# Patient Record
(demographics unavailable — no encounter records)

---

## 2025-03-17 NOTE — REASON FOR VISIT
[Home] : at home, [unfilled] , at the time of the visit. [Medical Office: ([facility])___] : at the medical office located in  [Telehealth (audio & video)] : This visit was provided via telehealth using real-time 2-way audio visual technology. [Verbal consent obtained from patient] : the patient, [unfilled] [Post Hospitalization] : a post hospitalization visit

## 2025-03-17 NOTE — HISTORY OF PRESENT ILLNESS
[FreeTextEntry1] : 59 y/o male with hx of HLD, PD stent placed at [facility]  Recently discharged form [facility] s/p ERCP with stent placement for bile duct stones seen on computed tomogram scan with suspected ascending cholangitis with markedly dilated echogenic fossae in the common bile duct which measured up to 16 mm.  Gallbladder showing distended with asymmetric mural thickening and pericholecystic fluid and multiple stones.  Patient present with son today. As per son, patient is doing well. Son reports patient no longer has abdominal pain. He is eating well, and his surgical site post gallbladder removal is without any pain or discomfort. Patient son reports records obtained from [facility] and discussed with surgeon. He said as per surgeon patient have evidence of pancreatitis and was supposed to f/u with MRI which was not performed.  [de-identified] : 2/27/2025  - Conglomerate of large choledocholithiasis was found. Partial removal was accomplished with biliary sphincterotomy, balloon sweep. Multiple stones were removed but multiple stones still remained. - A 10 Fr x 7 cm straight plastic biliary stent was inserted. [de-identified] : ACC: [id number]     EXAM:  CT ABDOMEN AND PELVIS IC   ORDERED BY: TING LUIS PROCEDURE DATE:  02/26/2025    INTERPRETATION:  CLINICAL INFORMATION: Generalized abdominal pain, elevated alkaline phosphatase. History of pancreatic duct stent.  COMPARISON: None.  CONTRAST/COMPLICATIONS: IV Contrast: Omnipaque 350  80 cc administered   20 cc discarded Oral Contrast: NONE .  PROCEDURE: CT of the Abdomen and Pelvis was performed. Sagittal and coronal reformats were performed.  FINDINGS: LOWER CHEST: Within normal limits.  LIVER: Within normal limits. BILE DUCTS: Markedly dilated with dependent echogenic foci in the common bile duct, which measures up to 16 mm. GALLBLADDER: Distended with asymmetric mural thickening and pericholecystic fluid. Multiple stones. SPLEEN: Within normal limits. PANCREAS: Predominantly atrophic parenchyma with pancreatic duct dilated up to 8mm with tapering to normal caliber in the head (602:29). ADRENALS: Within normal limits. KIDNEYS/URETERS: Tiny right cortical cysts.  BLADDER: Within normal limits. REPRODUCTIVE ORGANS: Prostate within normal limits.  BOWEL: No bowel obstruction. Appendix is normal. Colonic diverticulosis without acute diverticulitis. PERITONEUM/RETROPERITONEUM: Trace free fluid in the pelvis. VESSELS: Atherosclerotic changes. LYMPH NODES: No lymphadenopathy. ABDOMINAL WALL: Within normal limits. BONES: Within normal limits.  IMPRESSION: Findings are suggestive of acute cholecystitis. Marked biliary ductal dilatation secondary to choledocholithiasis. Marked pancreatic ductal dilatation with transition to normal caliber in the head, likely from a stricture.  --- End of Report ---  [de-identified] : ACC: [id number]     EXAM:  US ABDOMEN RT UPR QUADRANT   ORDERED BY: AIMEE PULIDO PROCEDURE DATE:  02/26/2025    INTERPRETATION:  CLINICAL INFORMATION: Elevated LFTs  COMPARISON: None available.  TECHNIQUE: Sonography of the right upper quadrant.  FINDINGS: Liver: 1.2 x 0.7 x 0.9 cm left lobe cyst Bile ducts: Dilated intra and extrahepatic bile ducts Common bile duct measures 16 mm. with intraluminal echogenic sludgelike material Gallbladder: Thick-walled gallbladder (up to 9 mm) with pericholecystic fluid, and sludge. Questionable stone versus polyp measuring 1.6 cm. Negative sonographic Stone's sign Pancreas: Cystic area within the visualized pancreas, likely dilated main pancreatic duct Right kidney: 10.7 cm. No hydronephrosis. Ascites: None. IVC: Visualized portions are within normal limits.  IMPRESSION: Dilated intra and extrahepatic bile ducts with the common bile duct measuring up to 1.6 cm with intraluminal echogenic material.  Thick-walled gallbladder with pericholecystic fluid, sludge, and questionable stone versus polyp. Negative sonographic Stone sign.  Dilated main pancreatic duct, partly imaged.  MRCP or ERCP is recommended for further evaluation.    --- End of Report ---

## 2025-03-17 NOTE — ASSESSMENT
[FreeTextEntry1] : 61 y/o male s/p ERCP w/ stent for CBD stones seen on CT imaging. ERCP with Conglomerate of large choledocholithiasis was found with partial removal accomplished with biliary sphincterotomy and balloon sweep who is recommended for a follow up ERCP for stent and removal of remaining stones.   We also discussed giving the CT finding of Marked pancreatic ductal dilatation with transition to normal caliber in the head, likely from a stricture and hx of Pancreatitis seen at [facility] we would recommend EUS of pancreas to r/o mass, lesion, stones, cyst. We also discussed these finding is most likely related to Gallstone and CBD stone obstructions or his daily use of ETOH.    We discussed the proposed EUS ERCP procedure, preparation and alternatives.  The risks (bleeding, perforation, infection) and benefits were discussed with the patient in details.  The patient understands the risks/benefits and agreed to proceed with procedure.   Plan 1. EUS ERCP stent/stone removal with Dr Medina on 4/24/2025 2. Instructions emailed.   I spent 15 minutes reviewing this patient's records and 19 minutes in face to face during this visit. All questions answered.  Patient to call me with any questions.

## 2025-03-31 NOTE — HISTORY OF PRESENT ILLNESS
[de-identified] : 60-year-old male presents for evaluation of right shoulder pain x 6 months. Denies trauma or injury. He complains of constant pain in the right shoulder worse with any movements of the arm and lifting. He feels weakness in the right arm. He has tried Tylenol with good relief. He went to the ER last week and had x-rays which we have for review. He has not had any treatment yet.  History and examination were carried out with the use of a Kinyarwanda

## 2025-03-31 NOTE — PHYSICAL EXAM
Sent in from the HF Office for acute on chronic dyspnoea and increase of 4 kg weight this past week. [Rad] : radial 2+ and symmetric bilaterally [de-identified] : The patient has no respiratory distress. Mood and affect are normal. The patient is alert and oriented to person, place and time. Examination of the cervical spine demonstrates no deformity. There is tenderness of the right paracervical muscles and the right trapezius muscle. There is mild muscle spasm. Cervical spine range of motion is right lateral rotation of 40, left lateral rotation of 40, extension of 45 and flexion of 45. Upper extremity neurologic exam is intact with regard to sensation. Motor function is 5 over 5 in all groups in the upper extremities. Deep tendon reflexes are 2+ and equal at the biceps, triceps and brachial radialis. Examination of the right shoulder demonstrates no deformity. The skin is intact. There is no erythema. There is tenderness anteriorly. Impingement sign is positive There is no instability. Drop arm test is negative. Empty can test is negative. Liftoff test is negative. Leon test is negative.  He has supraspinatus and infraspinatus atrophy bilaterally.  He has right shoulder elevation of 80 degrees, external rotation of 20 degrees and internal rotation of 30 degrees.  Elbows are stable and nontender.  There is no lymphedema. [de-identified] : ACC: [id number] EXAM: XR SHOULDER COMP MIN 2V RT ORDERED BY: EMMY DOMINGUEZ ACC: [id number] EXAM: XR SHOULDER AXILLARY 1 VIEW RT ORDERED BY: EMMY DOMINGUEZ PROCEDURE DATE: 03/24/2025 INTERPRETATION: CLINICAL INDICATION: right shoulder pain  EXAM: Internal and external rotation AP transscapular Y and axillary right shoulder from 3/24/2025. No similar prior studies available for comparison.  IMPRESSION: No fractures, dislocations, or AC separation.  Preserved joint spaces and smooth articular margins.  Maintained subacromial and coracoclavicular spaces.  No destructive osseous lesions or periosteal reaction.  No periarticular soft tissue calcifications.  --- End of Report ---   MAYRA BIRCH MD; Attending Radiologist This document has been electronically signed. Mar 24 2025 3:43PM

## 2025-03-31 NOTE — DISCUSSION/SUMMARY
[de-identified] : The patient has right shoulder pain and stiffness.  This may have been secondary to shoulder tendinitis or possible rotator cuff tear.  At this point I think the most important aspect of treatment would be to restoring range of motion.  He is referred for physical therapy.  He can be reevaluated in 6 weeks.